# Patient Record
(demographics unavailable — no encounter records)

---

## 2025-03-13 NOTE — ASSESSMENT
[FreeTextEntry1] : .  51 y/o M with obesity, current smoker, presenting to establish care. HPI as above.  # Obesity - f/u labs - discuss at f/u  # current smoker - used patched is past but reports feeling more craving on patch - would consider meds  # polyarthralgia - f/u labs - rheum eval  # Elevated BP - monitor home BP and review in 2 weeks - Counselled on low salt diet, exercise, weight loss.   # Neck swelling, prominent LN - f/u US   # HCM - f/u labs - declines flu vaccine - psa - never had scope, GI referral  f/u 2 weeks

## 2025-03-13 NOTE — PHYSICAL EXAM
[No Acute Distress] : no acute distress [Well-Appearing] : well-appearing [No Respiratory Distress] : no respiratory distress  [No Accessory Muscle Use] : no accessory muscle use [Clear to Auscultation] : lungs were clear to auscultation bilaterally [Normal Rate] : normal rate  [Regular Rhythm] : with a regular rhythm [Soft] : abdomen soft [Non Tender] : non-tender [Non-distended] : non-distended [Coordination Grossly Intact] : coordination grossly intact [Normal Affect] : the affect was normal [Normal Insight/Judgement] : insight and judgment were intact [de-identified] : prominent BL LN L >R

## 2025-03-13 NOTE — HEALTH RISK ASSESSMENT
[0] : 2) Feeling down, depressed, or hopeless: Not at all (0) [PHQ-2 Negative - No further assessment needed] : PHQ-2 Negative - No further assessment needed [Current] : Current [10-14] : 10-14 [PSQ2Ddzut] : 0

## 2025-03-13 NOTE — HISTORY OF PRESENT ILLNESS
[de-identified] : 51 y/o M with obesity, current smoker, presenting to establish care. + chronic BL hand pain, BL knee pain, lower back pain. + AM stiffness, better with activity. Better with NSAIDs.  + frequent swelling L neck on/off   Sx Hx: NA   Family Hx: Dad- DM, HTN. Mom- HTN. Graves- daughter, grandmother   Social Hx: current smoker, 1/2PPD for ~25 years---->12.5 pack yrs, social alcohol, daily marijuana, no illicit drug use. , one daughter. Building doorman.

## 2025-03-27 NOTE — ASSESSMENT
[FreeTextEntry1] : .  51 y/o M with obesity, current smoker, presenting for f/u.  # Obesity - discuss at f/u  # current smoker - used patched is past but reports feeling more craving on patch - would consider meds  # polyarthralgia - reviewed CRP mildly elevated - rheum eval  # Neck swelling, prominent LN - f/u US  # Low TSH - reviewed TSH 0, FT4 2.0, CMP unremarkable - f/u repeat with abs  # HLD - reviewed ldl 108, ASCVD 5.5 % - f/u CT and US - Counselled on diet, exercise, weight loss.   # intermittent headaches - neuro eval - TMJ referral provided  # HCM - declines flu vaccine - never had scope, GI referral  f/u pending above w/u

## 2025-03-27 NOTE — PHYSICAL EXAM
[No Acute Distress] : no acute distress [Well-Appearing] : well-appearing [No Respiratory Distress] : no respiratory distress  [No Accessory Muscle Use] : no accessory muscle use [Clear to Auscultation] : lungs were clear to auscultation bilaterally [Normal Rate] : normal rate  [Regular Rhythm] : with a regular rhythm [Coordination Grossly Intact] : coordination grossly intact [Normal Affect] : the affect was normal [Normal Insight/Judgement] : insight and judgment were intact [de-identified] : BL prominent LN

## 2025-03-27 NOTE — REVIEW OF SYSTEMS
[Negative] : Heme/Lymph [FreeTextEntry4] :  as per HPI  [FreeTextEntry9] :  as per HPI  [de-identified] :  as per HPI

## 2025-03-27 NOTE — HISTORY OF PRESENT ILLNESS
[de-identified] : 49 y/o M with obesity, current smoker, presenting for f/u. + chronic BL hand pain, BL knee pain, lower back pain. + AM stiffness, better with activity. Better with NSAIDs. + frequent swelling L neck on/off + intermittent headaches, worse in am. Used nightguard in past which helped but now back again. No N/V, hearing or vision changes. recent labs with TSH 0 FT4 2.0 and

## 2025-06-19 NOTE — HISTORY OF PRESENT ILLNESS
[FreeTextEntry1] : 50M here for consultation  Pt reports pain in thumbs, lower back, and knees. Pt reports this has been happening for a few years. Pt reports stiffness and swelling of joints.  Pt says pain is worse in am. Pt says after activities, pain is worse.  Pt has tried OTC pain meds, but minimal relief.   No fevers, h/a, rashes, hair loss, oral ulcers, epistaxis, sinusitis,  swollen glands, dry mouth, dry eyes, CP, , cough, vision changes, abdominal pain, GERD, n/v/d, blood in stool or urine, focal weakness, sensory loss,  Raynaud's, joint pain, swelling, weight loss.  +some mild SOB when moving around   Pt started on methimazole for high thyroid

## 2025-06-19 NOTE — ASSESSMENT
[FreeTextEntry1] : 50M w Graves w polyarthralgias =thumbs, lower back, and knees =stiffness, reported swelling; no obvious swelling on exam =Graves, recently started methimazole  OA vs RA vs seronegative spondyloarthropathy vs thyroid induced. Will send serologies and imaging.   Plan -Labs w serologies, inflammatory markers -Xrays hands/wrists, knees, lumbar spine/SI joint  RTO depending on above results

## 2025-06-19 NOTE — CONSULT LETTER
[Dear  ___] : Dear  [unfilled], [Consult Letter:] : I had the pleasure of evaluating your patient, [unfilled]. [Consult Closing:] : Thank you very much for allowing me to participate in the care of this patient.  If you have any questions, please do not hesitate to contact me. [Sincerely,] : Sincerely, [FreeTextEntry3] : Hi Meeks MD

## 2025-06-19 NOTE — PHYSICAL EXAM
[General Appearance - Well Nourished] : well nourished [General Appearance - Well Developed] : well developed [Sclera] : the sclera and conjunctiva were normal [Auscultation Breath Sounds / Voice Sounds] : lungs were clear to auscultation bilaterally [Heart Sounds] : normal S1 and S2 [Heart Sounds Gallop] : no gallops [Murmurs] : no murmurs [Abdomen Tenderness] : non-tender [Cervical Lymph Nodes Enlarged Posterior Bilaterally] : posterior cervical [Cervical Lymph Nodes Enlarged Anterior Bilaterally] : anterior cervical [Musculoskeletal - Swelling] : no joint swelling seen [] : no rash [Skin Lesions] : no skin lesions [Oriented To Time, Place, And Person] : oriented to person, place, and time

## 2025-06-23 NOTE — ASSESSMENT
[Methimazole Therapy/PTU Therapy] : Risks and benefits of methimazole therapy/PTU therapy were discussed with the patient, including rash, liver dysfunction, and agranulocytosis. Patient was instructed to call the office for flu-like symptoms (e.g. fever and sore-throat) [FreeTextEntry1] : Hyperthyroidism: Grave's Disease with positive TRAB and TSI Clinically Euthyroid Current Medication Dosage: 5mg Methimazole follow up repeat TFTs today f/u CMP Hx of thyroid nodules? none US Thyroid 04/2025 showing thyroiditis   Obesity Class 2: BMI >35  In need of lifestyle changes family changing diet and joined the gym

## 2025-06-23 NOTE — HISTORY OF PRESENT ILLNESS
[FreeTextEntry1] : HPI: 50 year old male presenting to establish care for Grave's disease.    PMHx: HLD   Allergies: NKDA      THYROID DISEASE HISTORY:   Risk Factors:   Family or Personal Hx. of thyroid disease? newly diagnosed with Grave's Disease  Goiter or Hx of Goiter? no hx Prior of current use of thyroid medication? 5mg Methimazole  Hx of autoimmune disease? daughter and great grandmother with Grave's disease  Recent iodine exposure?   Clinical Symptoms: previously had heat intolerance/ no changes in weight/ denies anxiety or insomnia or change in bowels, has joint pain in the knees and thumbs    LIFESTYLE FACTORS:   Eating patterns and weight history: no changes in weight carb conscious eating with family Activity/Sleep: started going to the gym.     Follow up care: PCP: Dariel Villeda      Surgical History: none    Family History: DM- father  Thyroid- daughter, grandmother maternal (Grave's) Autoimmune-  Cancer- Other-   Social History: occupation- [employer] system-  ETOH use-  Tobacco Hx.- daily - 1-2 cigs daily  Additional substance use-   Additional Medications: OTC vitamins and supplements: none

## 2025-07-14 NOTE — HISTORY OF PRESENT ILLNESS
[de-identified] : 49 y/o M with HLD, obesity, current smoker, graves disease, presenting for f/u. + polyarthralgia better than prior, seen by rheum, w/u in progress + neck swelling resolved + intermittent headaches better than prior, scheduled with neuro

## 2025-07-14 NOTE — ASSESSMENT
[FreeTextEntry1] : . 49 y/o M with HLD, obesity, current smoker, graves disease, presenting for f/u.  # HLD - f/u lipid panel on statin; c/w crestor 5mg po daily for now - 4/2025 CT calcium score 0 and carotid US with BL intimal thickening and small plaque - Counselled on diet, exercise, weight loss.  # Obesity - lost ~7lbs, watching diet and exercising  # current smoker - cutting back, didnt tolerate patches in past  # polyarthralgia - rheum w/u in progress  # graves disease # Neck swelling- resolved - US with evidence of thyroiditis (graves) and small R cyst - reviewed recent TSH, T4, T3 wnl - c/w methimazole 5mg po daily; endo f/u  # intermittent headaches- better - neuro scheduled - seen by TMJ specialist; pending new mouthguard  # HCM - never had scope, GI referral  f/u 3 months